# Patient Record
Sex: MALE | Race: ASIAN | NOT HISPANIC OR LATINO | URBAN - METROPOLITAN AREA
[De-identification: names, ages, dates, MRNs, and addresses within clinical notes are randomized per-mention and may not be internally consistent; named-entity substitution may affect disease eponyms.]

---

## 2018-02-02 ENCOUNTER — COMMUNICATION - HEALTHEAST (OUTPATIENT)
Dept: SCHEDULING | Facility: CLINIC | Age: 23
End: 2018-02-02

## 2018-02-08 ENCOUNTER — COMMUNICATION - HEALTHEAST (OUTPATIENT)
Dept: FAMILY MEDICINE | Facility: CLINIC | Age: 23
End: 2018-02-08

## 2018-02-09 ENCOUNTER — OFFICE VISIT - HEALTHEAST (OUTPATIENT)
Dept: FAMILY MEDICINE | Facility: CLINIC | Age: 23
End: 2018-02-09

## 2018-02-09 DIAGNOSIS — Z02.1 PHYSICAL EXAM, PRE-EMPLOYMENT: ICD-10-CM

## 2018-02-09 ASSESSMENT — MIFFLIN-ST. JEOR: SCORE: 1472.82

## 2018-02-12 LAB
QTF INTERPRETATION: NORMAL
QTF MITOGEN - NIL: >10 IU/ML
QTF NIL: 0.07 IU/ML
QTF RESULT: NEGATIVE
QTF TB ANTIGEN - NIL: 0 IU/ML

## 2018-05-14 ENCOUNTER — OFFICE VISIT - HEALTHEAST (OUTPATIENT)
Dept: FAMILY MEDICINE | Facility: CLINIC | Age: 23
End: 2018-05-14

## 2018-05-14 DIAGNOSIS — Z02.89 HISTORY AND PHYSICAL EXAMINATION, IMMIGRATION: ICD-10-CM

## 2018-05-14 ASSESSMENT — MIFFLIN-ST. JEOR: SCORE: 1464.6

## 2019-11-22 ENCOUNTER — COMMUNICATION - HEALTHEAST (OUTPATIENT)
Dept: UROLOGY | Facility: CLINIC | Age: 24
End: 2019-11-22

## 2020-04-23 ENCOUNTER — OFFICE VISIT - HEALTHEAST (OUTPATIENT)
Dept: FAMILY MEDICINE | Facility: CLINIC | Age: 25
End: 2020-04-23

## 2020-04-23 DIAGNOSIS — R10.84 ABDOMINAL PAIN, GENERALIZED: ICD-10-CM

## 2021-06-01 VITALS — WEIGHT: 129.56 LBS | BODY MASS INDEX: 22.96 KG/M2 | HEIGHT: 63 IN

## 2021-06-01 VITALS — HEIGHT: 63 IN | BODY MASS INDEX: 22.64 KG/M2 | WEIGHT: 127.75 LBS

## 2021-06-03 NOTE — TELEPHONE ENCOUNTER
Per [facility] provider, scan is clear and patient my possibly have passed a stone.  However there are no remaining stones.  Patient should f/u with PCP as needed.  Nalini Asencio RN

## 2021-06-07 NOTE — PROGRESS NOTES
"Joel Melendrez is a 25 y.o. male who is being evaluated via a billable telephone visit.      The patient has been notified of following:     \"This telephone visit will be conducted via a call between you and your physician/provider. We have found that certain health care needs can be provided without the need for a physical exam.  This service lets us provide the care you need with a short phone conversation.  If a prescription is necessary we can send it directly to your pharmacy.  If lab work is needed we can place an order for that and you can then stop by our lab to have the test done at a later time.    Telephone visits are billed at different rates depending on your insurance coverage. During this emergency period, for some insurers they may be billed the same as an in-person visit.  Please reach out to your insurance provider with any questions.    If during the course of the call the physician/provider feels a telephone visit is not appropriate, you will not be charged for this service.\"    Patient has given verbal consent to a Telephone visit? Yes    Patient declined AVS.     Additional provider notes:     Abdominal pain.     He was working yesterday and he had abdominal pain and was sent home due to coronavirus concerns. He works for a [employer] company. They said he needs a note to go back to work.     He has had the abdominal pain on and off for over a week. No diarrhea or nausea. No fevers.   He does have a history of heartburn, but this isn't very severe. He has taken medications in the past for it. He takes some kind of medication for his stomach pain now, but not daily.     He denies any fevers, nausea/vomiting, cough, shortness of breath, chest pain. He has not been exposed to anyone with coronavirus or anyone with symptoms of coronavirus.       Assessment/Plan:  Abdominal pain, resolved/well controlled.   Note written for patient to return to work. As long as his place of work is taking measures " recommended by the government to decrease spread of coronavirus, he is not at higher risk of catching or spreading the virus. He does not exhibit any concerning symptoms and may return to work.         Phone call duration:  12 minutes    Marian Park MD

## 2021-06-15 NOTE — PROGRESS NOTES
ASSESSMENT AND PLAN  1. 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  Maintains that his health is excellent.  Attempting to obtain records from his clinic in [place] [facility] test drawn today QTF-Mycobacterium tuberculosis by [facility]         CHIEF COMPLAINT:  Chief Complaint   Patient presents with     PPD Placement     for work       HISTORY OF PRESENT ILLNESS:  Joel is a 23 y.o. male presenting to the clinic today as a new patient. Joel is present with a Narda . He presents today for TB testing. He was born in [facility] but he is not sure if he had a BCG vaccine. He came to [place] in 2017. He is not sure if he was diagnosed with TB or took TB medication in [place]. He denies persistent cough, abnormal weight loss and night sweats.       REVIEW OF SYSTEMS:   He denies vomiting.   All other 10 point review of systems are negative.    PFSH:  . Pertinent past, family, social and medical history reviewed.   History   Smoking Status     Not on file   Smokeless Tobacco     Not on file       No family history on file.    Social History     Social History     Marital status: Single     Spouse name: N/A     Number of children: N/A     Years of education: N/A     Occupational History     Not on file.     Social History Main Topics     Smoking status: Not on file     Smokeless tobacco: Not on file     Alcohol use Not on file     Drug use: Not on file     Sexual activity: Not on file     Other Topics Concern     Not on file     Social History Narrative       No past surgical history on file.    Allergies not on file    Active Ambulatory Problems     Diagnosis Date Noted     No Active Ambulatory Problems     Resolved Ambulatory Problems     Diagnosis Date Noted     No Resolved Ambulatory Problems     No Additional Past Medical History       VITALS:  Vitals:    02/09/18 0927   BP: 112/68   Patient Site: Left Arm   Patient Position: Sitting   Cuff Size: Adult Regular   Pulse: 84  "  Resp: 16   Temp: 98  F (36.7  C)   TempSrc: Oral   Weight: 129 lb 9 oz (58.8 kg)   Height: 5' 3\" (1.6 m)     Wt Readings from Last 3 Encounters:   02/09/18 129 lb 9 oz (58.8 kg)     Body mass index is 22.95 kg/(m^2).    PHYSICAL EXAM:  General: Alert, cooperative, no distress, appears stated age  Head: Normocephalic, without obvious abnormality, atraumatic  Throat: Lips, mucosa, and tongue normal; teeth and gums normal  Lungs: Clear to auscultation bilaterally, respirations unlabored  Chest wall: No tenderness or deformity  Heart: Regular rate and rhythm, S1 and S2 normal, no murmur, rub, or gallop  CVS: No edema noted.   Neurologic: No tremor, no focal findings.     Psych: Oriented x3. Affect normal.     ADDITIONAL HISTORY SUMMARIZED (2): None.  DECISION TO OBTAIN EXTRA INFORMATION (1): None.   RADIOLOGY TESTS (1): None.  LABS (1): Labs ordered.   MEDICINE TESTS (1): None.  INDEPENDENT REVIEW (2 each): None.     The visit lasted a total of 19 minutes face to face with the patient. Over 50% of the time was spent counseling and educating the patient about TB testing.     IClemente, am scribing for and in the presence of, Dr. Romero.    Iyogesh, personally performed the services described in this documentation, as scribed by Clemente Amador in my presence, and it is both accurate and complete.    MEDICATIONS:  No current outpatient prescriptions on file.     No current facility-administered medications for this visit.        Total data points:1        "

## 2021-06-18 NOTE — PROGRESS NOTES
Assessment/Plan:        Diagnoses and associated orders for this visit:      History and physical examination, immigration- Immunizations reviewed and updated.  [employer] I-693 completed and given to pt in sealed envelope.  Copy scanned into chart, copy given to pt for their records.  RTC to PCP for routine scheduled care, sooner prn.       Orders Placed This Encounter   Procedures     Tdap vaccine,  6yo or older,  [place]     Hepatitis B Vaccine Age 20 years and above     Varicella vaccine subq           Subjective:    Patient ID: Joel Melendrez is a 23 y.o. male    HPI Pt is here for a green card exam, came to [place] as a refugee. Arrived in [place] in 4/17, [place] last fall.  No immunization or lab records from [place], does have his overseas immunization records with him. No concerns about his health.     The following portions of the patient's history were reviewed and updated as appropriate: allergies, current medications, past family history, past medical history, past social history, past surgical history and problem list.    Review of Systems      Neg other than HPI.     Objective:    Physical Exam          Patient is in no apparent physical distress.  Vitals are as recorded.  Head and face are normal.  Conjunctiva are clear.  Neck is supple.  Resp rate and effort normal.     Extremities are without edema.  Gait is normal.  Skin is without rashes.  Mood and affect are appropriate.

## 2021-06-20 NOTE — LETTER
Letter by Marian Park MD at      Author: Marian Park MD Service: -- Author Type: --    Filed:  Encounter Date: 4/23/2020 Status: (Other)         To whom it may concern,       Joel Melendrez is a patient at our clinic. He was seen today for evaluation of abdominal pain.     He is able to return to work without any restrictions except those recommended by the government for everyone during the COVID19 pandemic.         Please let us know if you have any questions.              Marian Park MD